# Patient Record
Sex: FEMALE | Race: BLACK OR AFRICAN AMERICAN | Employment: UNEMPLOYED | URBAN - METROPOLITAN AREA
[De-identification: names, ages, dates, MRNs, and addresses within clinical notes are randomized per-mention and may not be internally consistent; named-entity substitution may affect disease eponyms.]

---

## 2024-02-02 ENCOUNTER — INITIAL PRENATAL (OUTPATIENT)
Dept: OBGYN CLINIC | Age: 44
End: 2024-02-02
Payer: COMMERCIAL

## 2024-02-02 ENCOUNTER — ANCILLARY PROCEDURE (OUTPATIENT)
Dept: OBGYN CLINIC | Age: 44
End: 2024-02-02
Payer: COMMERCIAL

## 2024-02-02 VITALS — SYSTOLIC BLOOD PRESSURE: 152 MMHG | WEIGHT: 250.2 LBS | DIASTOLIC BLOOD PRESSURE: 108 MMHG | HEART RATE: 78 BPM

## 2024-02-02 DIAGNOSIS — Z34.90 EIGHTH PREGNANCY: ICD-10-CM

## 2024-02-02 DIAGNOSIS — Z3A.15 15 WEEKS GESTATION OF PREGNANCY: Primary | ICD-10-CM

## 2024-02-02 DIAGNOSIS — O09.522 MULTIGRAVIDA OF ADVANCED MATERNAL AGE IN SECOND TRIMESTER: ICD-10-CM

## 2024-02-02 LAB
GLUCOSE URINE, POC: NEGATIVE
PROTEIN UA: NEGATIVE

## 2024-02-02 PROCEDURE — G8421 BMI NOT CALCULATED: HCPCS | Performed by: OBSTETRICS & GYNECOLOGY

## 2024-02-02 PROCEDURE — 99203 OFFICE O/P NEW LOW 30 MIN: CPT | Performed by: OBSTETRICS & GYNECOLOGY

## 2024-02-02 PROCEDURE — G8427 DOCREV CUR MEDS BY ELIG CLIN: HCPCS | Performed by: OBSTETRICS & GYNECOLOGY

## 2024-02-02 PROCEDURE — 76805 OB US >/= 14 WKS SNGL FETUS: CPT | Performed by: OBSTETRICS & GYNECOLOGY

## 2024-02-02 PROCEDURE — G8484 FLU IMMUNIZE NO ADMIN: HCPCS | Performed by: OBSTETRICS & GYNECOLOGY

## 2024-02-02 PROCEDURE — 3080F DIAST BP >= 90 MM HG: CPT | Performed by: OBSTETRICS & GYNECOLOGY

## 2024-02-02 PROCEDURE — 3077F SYST BP >= 140 MM HG: CPT | Performed by: OBSTETRICS & GYNECOLOGY

## 2024-02-02 PROCEDURE — 81002 URINALYSIS NONAUTO W/O SCOPE: CPT | Performed by: OBSTETRICS & GYNECOLOGY

## 2024-02-02 PROCEDURE — 1036F TOBACCO NON-USER: CPT | Performed by: OBSTETRICS & GYNECOLOGY

## 2024-02-02 PROCEDURE — 99204 OFFICE O/P NEW MOD 45 MIN: CPT | Performed by: OBSTETRICS & GYNECOLOGY

## 2024-02-02 RX ORDER — LABETALOL 300 MG/1
TABLET, FILM COATED ORAL
COMMUNITY
Start: 2023-12-07

## 2024-02-02 RX ORDER — ONDANSETRON HYDROCHLORIDE 8 MG/1
TABLET, FILM COATED ORAL
COMMUNITY
Start: 2023-11-29

## 2024-02-02 RX ORDER — NIFEDIPINE 30 MG/1
30 TABLET, EXTENDED RELEASE ORAL 2 TIMES DAILY
Qty: 60 TABLET | Refills: 5 | Status: SHIPPED | OUTPATIENT
Start: 2024-02-02

## 2024-02-02 NOTE — PROGRESS NOTES
[facility] SPECIALTY CARE [facility] MATERNAL FETAL MEDICINE  [address] [place] [id number]  Dept: [phone]  Loc: [phone]     2024    RE:  Obdulia Sood     : 1980   AGE: 44 y.o.     Dear Dr. West,    Thank you for allowing me to provide prenatal consultation for Obdulia Sood.  As I'm sure you will recall, Obdulia Sood is a 44 y.o. [id number] LMP recorded. Patient is pregnant. Estimated Date of Delivery: 24 at 15w5d seen in our office today for the following:    REASON FOR CONSULTATION:  Patient Active Problem List    Diagnosis Date Noted    Eighth pregnancy 2024    Multigravida of advanced maternal age in second trimester 2024    15 weeks gestation of pregnancy 2024    HTN (hypertension), benign 2011    Abnormal Hormonal screen 2010    Previous  delivery, antepartum condition or complication 2010        PAST HISTORY:  OB History    Para Term  AB Living   8 3 3 0 4 3   SAB IAB Ectopic Molar Multiple Live Births   1 1       3      # Outcome Date GA Lbr Vincent/2nd Weight Sex Delivery Anes PTL Lv   8 Current            7 SAB      SAB      6 Term 17 39w0d  2.92 kg (6 lb 7 oz) M CS-Unspec   DEAN   5 Term 11 39w0d  3.345 kg (7 lb 6 oz) M CS-Unspec   DEAN   4 IAB 2011           3 AB            2 Term 02 40w0d  2.268 kg (5 lb) F CS-Unspec   DEAN   1 AB                   MEDICAL:  Past Medical History:   Diagnosis Date    Fetal damage, exposed to ACE 2010    Hypertension         SURGICAL:  Past Surgical History:   Procedure Laterality Date    CARPAL TUNNEL RELEASE Bilateral      SECTION      WISDOM TOOTH EXTRACTION Bilateral        ALLERGIES:    No Known Allergies      MEDICATIONS:    Current Outpatient Medications   Medication Sig Dispense Refill    labetalol (NORMODYNE) 300 MG tablet       ondansetron (ZOFRAN) 8 MG tablet 1 tablet as needed Orally twice

## 2024-02-05 ENCOUNTER — TELEPHONE (OUTPATIENT)
Dept: OBGYN CLINIC | Age: 44
End: 2024-02-05

## 2024-02-05 NOTE — TELEPHONE ENCOUNTER
Patient called in with her blood pressure readings.  Patient stated she took the new BP med, Procardia Saturday morning and after readings for Saturday was 200/125 and 218/128. She ended up taking her labetalol and it was 136/96. She went to the hospital Sunday because she was not feeling right. It was 150/77 when they discharged her home. The hospital told her to take 400 mg of her labetalol and her pressure this morning an hour after taking it is 146/94. Patient states she sees her regular OB tomorrow. She did not think the Procardia was working for her.

## 2024-02-05 NOTE — TELEPHONE ENCOUNTER
Patient informed to take the procardia 2 tablets in the morning and 2 at night. Patient will call me this week with BPs

## 2024-02-12 NOTE — TELEPHONE ENCOUNTER
Patient called back in with her blood pressures, still running high. Her current BP was 158/108. Spoke with . Patient is to take her labetalol 200 mg twice a day along with her procardia 2 tablets, twice a day.